# Patient Record
Sex: MALE | URBAN - METROPOLITAN AREA
[De-identification: names, ages, dates, MRNs, and addresses within clinical notes are randomized per-mention and may not be internally consistent; named-entity substitution may affect disease eponyms.]

---

## 2021-05-17 ENCOUNTER — OFFICE VISIT (OUTPATIENT)
Dept: URBAN - METROPOLITAN AREA CLINIC 113 | Facility: CLINIC | Age: 46
End: 2021-05-17

## 2021-06-16 ENCOUNTER — OFFICE VISIT (OUTPATIENT)
Dept: URBAN - METROPOLITAN AREA CLINIC 113 | Facility: CLINIC | Age: 46
End: 2021-06-16
Payer: COMMERCIAL

## 2021-06-16 ENCOUNTER — WEB ENCOUNTER (OUTPATIENT)
Dept: URBAN - METROPOLITAN AREA CLINIC 113 | Facility: CLINIC | Age: 46
End: 2021-06-16

## 2021-06-16 VITALS
RESPIRATION RATE: 20 BRPM | TEMPERATURE: 97.1 F | DIASTOLIC BLOOD PRESSURE: 87 MMHG | HEART RATE: 74 BPM | SYSTOLIC BLOOD PRESSURE: 121 MMHG | WEIGHT: 204.5 LBS | BODY MASS INDEX: 32.1 KG/M2 | HEIGHT: 67 IN

## 2021-06-16 DIAGNOSIS — R74.8 ELEVATED LIVER ENZYMES: ICD-10-CM

## 2021-06-16 DIAGNOSIS — R10.11 RUQ PAIN: ICD-10-CM

## 2021-06-16 DIAGNOSIS — K80.50 CHOLEDOCHOLITHIASIS: ICD-10-CM

## 2021-06-16 PROCEDURE — 99204 OFFICE O/P NEW MOD 45 MIN: CPT | Performed by: INTERNAL MEDICINE

## 2021-06-16 PROCEDURE — 99244 OFF/OP CNSLTJ NEW/EST MOD 40: CPT | Performed by: INTERNAL MEDICINE

## 2021-06-16 RX ORDER — MECLIZINE HCL 25MG 25 MG/1
1 TABLET AS NEEDED TABLET, CHEWABLE ORAL ONCE A DAY
Status: ON HOLD | COMMUNITY

## 2021-06-16 RX ORDER — ONDANSETRON 4 MG/1
1 TABLET ON THE TONGUE AND ALLOW TO DISSOLVE TABLET, ORALLY DISINTEGRATING ORAL ONCE A DAY
Status: ON HOLD | COMMUNITY

## 2021-06-16 NOTE — HPI-TODAY'S VISIT:
The patient was referred by Dr. Dago Cabrera for "gallbladder pain".   A copy of this document is being forwarded to the referring provider.  46-year-old male presenting with a primary complaint of abdominal pain.  He was seen at the [facility] emergency room for these symptoms.  He did have a prior history of several hours right upper quadrant abdominal pain.  He has had a cholecystectomy about 15  years ago in [place].  He was having some abdominal pain and had an ERCP in 2019 in [place]. He was found to have some sludge in his cbd duct. He does think he had a stent at that time. His vital signs were normal in the emergency room.  He had a workup which included an ultrasound, abdominal x-ray, and basic lab work.  He was found have a white blood cell count 12.7, hemoglobin 14.5, hematocrit 44.4, weight 240, sodium 141, creatinine 0.97, total bilirubin 0.8, , ALT 84, alkaline phosphatase 58, lipase 88, hepatitis C normal.  His ultrasound demonstrated a prior laparoscopic cholecystectomy as well as suggestion of a stone in the common bile duct.  He was placed on antibiotics and discharged to follow with us.

## 2021-07-15 ENCOUNTER — TELEPHONE ENCOUNTER (OUTPATIENT)
Dept: URBAN - METROPOLITAN AREA CLINIC 113 | Facility: CLINIC | Age: 46
End: 2021-07-15

## 2021-07-23 ENCOUNTER — OFFICE VISIT (OUTPATIENT)
Dept: URBAN - METROPOLITAN AREA CLINIC 113 | Facility: CLINIC | Age: 46
End: 2021-07-23

## 2021-07-23 RX ORDER — ONDANSETRON 4 MG/1
1 TABLET ON THE TONGUE AND ALLOW TO DISSOLVE TABLET, ORALLY DISINTEGRATING ORAL ONCE A DAY
Status: ON HOLD | COMMUNITY

## 2021-07-23 RX ORDER — MECLIZINE HCL 25MG 25 MG/1
1 TABLET AS NEEDED TABLET, CHEWABLE ORAL ONCE A DAY
Status: ON HOLD | COMMUNITY

## 2021-07-28 ENCOUNTER — OFFICE VISIT (OUTPATIENT)
Dept: URBAN - METROPOLITAN AREA MEDICAL CENTER 2 | Facility: MEDICAL CENTER | Age: 46
End: 2021-07-28
Payer: COMMERCIAL

## 2021-07-28 ENCOUNTER — OFFICE VISIT (OUTPATIENT)
Dept: URBAN - METROPOLITAN AREA CLINIC 113 | Facility: CLINIC | Age: 46
End: 2021-07-28

## 2021-07-28 DIAGNOSIS — K80.50 BILIARY CALCULI, COMMON BILE DUCT: ICD-10-CM

## 2021-07-28 DIAGNOSIS — R93.2 ABNORMAL CHOLANGIOGRAM: ICD-10-CM

## 2021-07-28 PROCEDURE — 43264 ERCP REMOVE DUCT CALCULI: CPT | Performed by: INTERNAL MEDICINE

## 2021-07-28 PROCEDURE — 74328 X-RAY BILE DUCT ENDOSCOPY: CPT | Performed by: INTERNAL MEDICINE

## 2021-08-30 ENCOUNTER — OFFICE VISIT (OUTPATIENT)
Dept: URBAN - METROPOLITAN AREA CLINIC 113 | Facility: CLINIC | Age: 46
End: 2021-08-30
Payer: COMMERCIAL

## 2021-08-30 VITALS
SYSTOLIC BLOOD PRESSURE: 122 MMHG | HEART RATE: 76 BPM | HEIGHT: 67 IN | TEMPERATURE: 98.4 F | WEIGHT: 209 LBS | BODY MASS INDEX: 32.8 KG/M2 | DIASTOLIC BLOOD PRESSURE: 82 MMHG

## 2021-08-30 DIAGNOSIS — R10.11 RUQ PAIN: ICD-10-CM

## 2021-08-30 DIAGNOSIS — K80.50 CHOLEDOCHOLITHIASIS: ICD-10-CM

## 2021-08-30 DIAGNOSIS — R74.8 ELEVATED LIVER ENZYMES: ICD-10-CM

## 2021-08-30 PROCEDURE — 99213 OFFICE O/P EST LOW 20 MIN: CPT | Performed by: INTERNAL MEDICINE

## 2021-08-30 RX ORDER — MECLIZINE HCL 25MG 25 MG/1
1 TABLET AS NEEDED TABLET, CHEWABLE ORAL ONCE A DAY
Status: ON HOLD | COMMUNITY

## 2021-08-30 RX ORDER — ONDANSETRON 4 MG/1
1 TABLET ON THE TONGUE AND ALLOW TO DISSOLVE TABLET, ORALLY DISINTEGRATING ORAL ONCE A DAY
Status: ON HOLD | COMMUNITY

## 2021-08-30 NOTE — HPI-TODAY'S VISIT:
46-year-old male presenting for follow-up.  He was last seen in clinic on June 16, 2021. Since then he has had an MRI and MRCP on July 22, 2021.  It did demonstrate multiple stones and debris in the common bile duct as well as a biliary dilation up to 1.7 cm. He was scheduled for an ERCP which was performed on July 28 2021.   He was found have multiple stones which were removed as well as sludge.  He tolerated this procedure well.  He is here today for follow-up.  6/16/21 The patient was referred by Dr. Dago Cabrera for "gallbladder pain".   A copy of this document is being forwarded to the referring provider.  46-year-old male presenting with a primary complaint of abdominal pain.  He was seen at the [facility] emergency room for these symptoms.  He did have a prior history of several hours right upper quadrant abdominal pain.  He has had a cholecystectomy about 15  years ago in [place].  He was having some abdominal pain and had an ERCP in 2019 in [place]. He was found to have some sludge in his cbd duct. He does think he had a stent at that time. His vital signs were normal in the emergency room.  He had a workup which included an ultrasound, abdominal x-ray, and basic lab work.  He was found have a white blood cell count 12.7, hemoglobin 14.5, hematocrit 44.4, weight 240, sodium 141, creatinine 0.97, total bilirubin 0.8, , ALT 84, alkaline phosphatase 58, lipase 88, hepatitis C normal.  His ultrasound demonstrated a prior laparoscopic cholecystectomy as well as suggestion of a stone in the common bile duct.  He was placed on antibiotics and discharged to follow with us.

## 2024-01-17 ENCOUNTER — OFFICE VISIT (OUTPATIENT)
Dept: URBAN - METROPOLITAN AREA CLINIC 113 | Facility: CLINIC | Age: 49
End: 2024-01-17
Payer: COMMERCIAL

## 2024-01-17 ENCOUNTER — DASHBOARD ENCOUNTERS (OUTPATIENT)
Age: 49
End: 2024-01-17

## 2024-01-17 VITALS
RESPIRATION RATE: 16 BRPM | HEIGHT: 67 IN | BODY MASS INDEX: 32.96 KG/M2 | SYSTOLIC BLOOD PRESSURE: 138 MMHG | WEIGHT: 210 LBS | DIASTOLIC BLOOD PRESSURE: 87 MMHG | HEART RATE: 91 BPM | TEMPERATURE: 96.5 F

## 2024-01-17 DIAGNOSIS — K80.50 CHOLEDOCHOLITHIASIS: ICD-10-CM

## 2024-01-17 DIAGNOSIS — R10.11 RUQ PAIN: ICD-10-CM

## 2024-01-17 DIAGNOSIS — R74.8 ELEVATED LIVER ENZYMES: ICD-10-CM

## 2024-01-17 DIAGNOSIS — Z12.11 COLON CANCER SCREENING: ICD-10-CM

## 2024-01-17 PROCEDURE — 99213 OFFICE O/P EST LOW 20 MIN: CPT | Performed by: INTERNAL MEDICINE

## 2024-01-17 RX ORDER — ONDANSETRON HYDROCHLORIDE 8 MG/1
1 TABLET TABLET, FILM COATED ORAL EVERY 8 HOURS PRN
Qty: 90 | Refills: 1 | OUTPATIENT
Start: 2024-01-17

## 2024-01-17 RX ORDER — MECLIZINE HCL 25MG 25 MG/1
1 TABLET AS NEEDED TABLET, CHEWABLE ORAL ONCE A DAY
Status: ON HOLD | COMMUNITY

## 2024-01-17 RX ORDER — ONDANSETRON 4 MG/1
1 TABLET ON THE TONGUE AND ALLOW TO DISSOLVE TABLET, ORALLY DISINTEGRATING ORAL ONCE A DAY
Status: ACTIVE | COMMUNITY

## 2024-01-17 NOTE — HPI-TODAY'S VISIT:
47 y/o male presenting for f/u. He was last seen in August 2023. He feels like he had a gallbladder attack. He had increased abdominal pain. It lasted about 15 minutes. He did not have nause/emesis. It has since resolved. He did not have any labs or ER visit. It occurred about 1 month ago. This occurred in the setting of him being dehydrated.   8/2023 46-year-old male presenting for follow-up.  He was last seen in clinic on June 16, 2021. Since then he has had an MRI and MRCP on July 22, 2021.  It did demonstrate multiple stones and debris in the common bile duct as well as a biliary dilation up to 1.7 cm. He was scheduled for an ERCP which was performed on July 28 2021.   He was found have multiple stones which were removed as well as sludge.  He tolerated this procedure well.  He is here today for follow-up.  6/16/21 The patient was referred by Dr. Dago Cabrera for "gallbladder pain".   A copy of this document is being forwarded to the referring provider.  46-year-old male presenting with a primary complaint of abdominal pain.  He was seen at the [facility] emergency room for these symptoms.  He did have a prior history of several hours right upper quadrant abdominal pain.  He has had a cholecystectomy about 15  years ago in [place].  He was having some abdominal pain and had an ERCP in 2019 in [place]. He was found to have some sludge in his cbd duct. He does think he had a stent at that time. His vital signs were normal in the emergency room.  He had a workup which included an ultrasound, abdominal x-ray, and basic lab work.  He was found have a white blood cell count 12.7, hemoglobin 14.5, hematocrit 44.4, weight 240, sodium 141, creatinine 0.97, total bilirubin 0.8, , ALT 84, alkaline phosphatase 58, lipase 88, hepatitis C normal.  His ultrasound demonstrated a prior laparoscopic cholecystectomy as well as suggestion of a stone in the common bile duct.  He was placed on antibiotics and discharged to follow with us.

## 2024-02-27 ENCOUNTER — COLON (OUTPATIENT)
Dept: URBAN - METROPOLITAN AREA SURGERY CENTER 25 | Facility: SURGERY CENTER | Age: 49
End: 2024-02-27

## 2024-02-27 RX ORDER — ONDANSETRON HYDROCHLORIDE 8 MG/1
1 TABLET TABLET, FILM COATED ORAL EVERY 8 HOURS PRN
Qty: 90 | Refills: 1 | Status: ACTIVE | COMMUNITY
Start: 2024-01-17

## 2024-02-27 RX ORDER — MECLIZINE HCL 25MG 25 MG/1
1 TABLET AS NEEDED TABLET, CHEWABLE ORAL ONCE A DAY
Status: ON HOLD | COMMUNITY

## 2024-02-27 RX ORDER — ONDANSETRON 4 MG/1
1 TABLET ON THE TONGUE AND ALLOW TO DISSOLVE TABLET, ORALLY DISINTEGRATING ORAL ONCE A DAY
Status: ACTIVE | COMMUNITY

## 2024-04-17 ENCOUNTER — OV EP (OUTPATIENT)
Dept: URBAN - METROPOLITAN AREA CLINIC 113 | Facility: CLINIC | Age: 49
End: 2024-04-17